# Patient Record
Sex: MALE | Race: WHITE | NOT HISPANIC OR LATINO | URBAN - METROPOLITAN AREA
[De-identification: names, ages, dates, MRNs, and addresses within clinical notes are randomized per-mention and may not be internally consistent; named-entity substitution may affect disease eponyms.]

---

## 2018-06-27 ENCOUNTER — OFFICE VISIT - RIVER FALLS (OUTPATIENT)
Dept: FAMILY MEDICINE | Facility: CLINIC | Age: 83
End: 2018-06-27

## 2020-05-07 ENCOUNTER — COMMUNICATION - RIVER FALLS (OUTPATIENT)
Dept: FAMILY MEDICINE | Facility: CLINIC | Age: 85
End: 2020-05-07

## 2020-06-01 ENCOUNTER — OFFICE VISIT - RIVER FALLS (OUTPATIENT)
Dept: FAMILY MEDICINE | Facility: CLINIC | Age: 85
End: 2020-06-01

## 2020-06-02 ENCOUNTER — COMMUNICATION - RIVER FALLS (OUTPATIENT)
Dept: FAMILY MEDICINE | Facility: CLINIC | Age: 85
End: 2020-06-02

## 2020-06-02 LAB
ERYTHROCYTE [DISTWIDTH] IN BLOOD BY AUTOMATED COUNT: 15.9 % (ref 11–15)
FERRITIN SERPL-MCNC: 319 NG/ML (ref 24–380)
HCT VFR BLD AUTO: 29.2 % (ref 38.5–50)
HGB BLD-MCNC: 9.8 GM/DL (ref 13.2–17.1)
MCH RBC QN AUTO: 33 PG (ref 27–33)
MCHC RBC AUTO-ENTMCNC: 33.6 GM/DL (ref 32–36)
MCV RBC AUTO: 98.3 FL (ref 80–100)
PLATELET # BLD AUTO: 199 10*3/UL (ref 140–400)
PMV BLD: 11.1 FL (ref 7.5–12.5)
RBC # BLD AUTO: 2.97 10*6/UL (ref 4.2–5.8)
WBC # BLD AUTO: 9.6 10*3/UL (ref 3.8–10.8)

## 2020-06-04 ENCOUNTER — COMMUNICATION - RIVER FALLS (OUTPATIENT)
Dept: FAMILY MEDICINE | Facility: CLINIC | Age: 85
End: 2020-06-04

## 2020-06-15 ENCOUNTER — OFFICE VISIT - RIVER FALLS (OUTPATIENT)
Dept: FAMILY MEDICINE | Facility: CLINIC | Age: 85
End: 2020-06-15

## 2020-09-21 ENCOUNTER — OFFICE VISIT - RIVER FALLS (OUTPATIENT)
Dept: FAMILY MEDICINE | Facility: CLINIC | Age: 85
End: 2020-09-21

## 2020-12-16 ENCOUNTER — OFFICE VISIT - RIVER FALLS (OUTPATIENT)
Dept: FAMILY MEDICINE | Facility: CLINIC | Age: 85
End: 2020-12-16

## 2020-12-16 LAB
BUN SERPL-MCNC: 36 MG/DL
BUN/CREAT RATIO - HISTORICAL: 27
CALCIUM SERPL-MCNC: 8.2 MEQ/DL
CREAT SERPL-MCNC: 1.34 MG/DL
FERRITIN SERPL-MCNC: 288.9 NG/ML
GLUCOSE BLD-MCNC: 109 MG/DL
HCT VFR BLD AUTO: 25 %
HGB BLD-MCNC: 8.5 G/DL
PLATELET # BLD AUTO: 157 X10
POTASSIUM BLD-SCNC: 4 MEQ/L
RBC # BLD AUTO: 2.46 X10
SODIUM SERPL-SCNC: 138 MEQ/L
VIT B12 SERPL-MCNC: 1300 PG/ML
WBC # BLD AUTO: 8.8 X10

## 2021-02-05 ENCOUNTER — OFFICE VISIT - RIVER FALLS (OUTPATIENT)
Dept: FAMILY MEDICINE | Facility: CLINIC | Age: 86
End: 2021-02-05

## 2021-05-25 ENCOUNTER — RECORDS - HEALTHEAST (OUTPATIENT)
Dept: ADMINISTRATIVE | Facility: CLINIC | Age: 86
End: 2021-05-25

## 2022-02-12 VITALS
WEIGHT: 155 LBS | TEMPERATURE: 97.2 F | HEART RATE: 75 BPM | SYSTOLIC BLOOD PRESSURE: 100 MMHG | DIASTOLIC BLOOD PRESSURE: 60 MMHG

## 2022-02-12 VITALS
HEART RATE: 64 BPM | SYSTOLIC BLOOD PRESSURE: 120 MMHG | TEMPERATURE: 95.6 F | WEIGHT: 155 LBS | DIASTOLIC BLOOD PRESSURE: 58 MMHG

## 2022-02-12 VITALS
SYSTOLIC BLOOD PRESSURE: 124 MMHG | OXYGEN SATURATION: 96 % | DIASTOLIC BLOOD PRESSURE: 62 MMHG | WEIGHT: 152.4 LBS | BODY MASS INDEX: 24.6 KG/M2 | HEART RATE: 92 BPM

## 2022-02-12 VITALS
HEART RATE: 70 BPM | DIASTOLIC BLOOD PRESSURE: 62 MMHG | SYSTOLIC BLOOD PRESSURE: 120 MMHG | WEIGHT: 161 LBS | TEMPERATURE: 97.3 F

## 2022-02-15 NOTE — PROGRESS NOTES
Chief Complaint    trouble hearing. pt c/o wax bulid up in ears  History of Present Illness      Patient is here with concerns about cerumen impaction.  He has had this problem in the past.  He wears hearing aids.  It is been difficult for him to use his hearing aids due to the current cerumen problem.  Review of Systems      See HPI.  All other review of systems negative.  Physical Exam   Vitals & Measurements    T: 97.3   F (Tympanic)  HR: 70(Peripheral)  BP: 120/62       Alert, oriented, no acute distress       Ear canals are occluded with cerumen       Normal heart rate       Nonlabored breathing  Assessment/Plan       Bilateral impacted cerumen (H61.23)        Cerumen is removed with a combination of curette and lavage revealing normal tympanic membranes bilaterally.  He will continue with weekly drops to help control the cerumen.  He may return to using his hearing aids.  Patient Information     Name:DAKOTA BOWEN      Address:      [address] [place] [address]      Sex:Male      YOB: 1930      [phone]     MRN:[id number]     FIN:[id number]     Location:[facility]     Date of Service:06/27/2018      Primary Care Physician:       Newton Hein MD, [phone]      Attending Physician:       Kwabena Marie MD, [phone]  Problem List/Past Medical History    Ongoing     Aortic stenosis       Comments: Porcine AoV 8/2007     Colon Polyps     GERD (Gastroesophageal Reflux Disease)     Sensory Peripheral Neuropathy     Umbilical Hernia    Historical     No qualifying data  Procedure/Surgical History     Colonoscopy (11/19/2008)     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        Vitamin C: 0 Refill(s).        beta-carotene: PO, Daily.        lysine: 0 Refill(s).        Saw Palmetto: 0 Refill(s).         omeprazole 40 mg oral delayed release capsule: 40 mg, 1 cap(s), PO, Daily, 30 cap(s).        furosemide: daily, 0 Refill(s).                Allergies    No known allergies  Social History    Smoking Status - 12/30/2011     No     Alcohol - Current, 04/22/2010     Employment and Education      Retired, 12/30/2011     Home and Environment      Marital status: ., 12/30/2011     Tobacco - Denies Tobacco Use, 04/22/2010  Family History    Asthma: Father.    CHF - Congestive heart failure: Father.  Immunizations      Vaccine Date Status      pneumococcal (PPSV23) 12/03/2010 Given

## 2022-02-15 NOTE — NURSING NOTE
Comprehensive Intake Entered On:  6/1/2020 11:18 AM CDT    Performed On:  6/1/2020 11:08 AM CDT by Susan Caruso               Summary   Chief Complaint :   Face to face for home care, check R shoulder to see if sling can come off, labs- hgb.   Weight Measured :   155.0 lb(Converted to: 155 lb 0 oz, 70.31 kg)    Systolic Blood Pressure :   100 mmHg   Diastolic Blood Pressure :   60 mmHg   Mean Arterial Pressure :   73 mmHg   Peripheral Pulse Rate :   75 bpm   BP Site :   Right arm   BP Method :   Manual   HR Method :   Electronic   Temperature Tympanic :   97.2 DegF(Converted to: 36.2 DegC)  (LOW)    Susan Caruso - 6/1/2020 11:08 AM CDT   Health Status   Allergies Verified? :   Yes   Medication History Verified? :   Yes   Medical History Verified? :   Yes   Pre-Visit Planning Status :   N/A   Tobacco Use? :   Never smoker   Susan Caruso - 6/1/2020 11:08 AM CDT   Consents   Consent for Immunization Exchange :   Consent Granted   Consent for Immunizations to Providers :   Consent Granted   Susan Caruso - 6/1/2020 11:08 AM CDT   Meds / Allergies   (As Of: 6/1/2020 11:18:35 AM CDT)   Allergies (Active)   No known allergies  Estimated Onset Date:   Unspecified ; Created By:   Marisol Almendarez; Reaction Status:   Active ; Category:   Drug ; Substance:   No known allergies ; Type:   Allergy ; Updated By:   Marisol Almendarez; Reviewed Date:   6/1/2020 11:16 AM CDT        Medication List   (As Of: 6/1/2020 11:18:35 AM CDT)   Prescription/Discharge Order    omeprazole  :   omeprazole ; Status:   Prescribed ; Ordered As Mnemonic:   omeprazole 40 mg oral delayed release capsule ; Simple Display Line:   40 mg, 1 cap(s), PO, Daily, 30 cap(s) ; Ordering Provider:   Newton Hein MD; Catalog Code:   omeprazole ; Order Dt/Tm:   4/26/2010 11:00:12 AM CDT            Home Meds    ergocalciferol  :   ergocalciferol ; Status:   Documented ; Ordered As Mnemonic:   Vitamin D2 ; Simple Display Line:   Oral, take every 3rd  day, 0 Refill(s) ; Catalog Code:   ergocalciferol ; Order Dt/Tm:   4/21/2020 11:03:08 AM CDT          calcium carbonate  :   calcium carbonate ; Status:   Documented ; Ordered As Mnemonic:   Tums ; Simple Display Line:   0.5 tab, Chewed, daily, 0 Refill(s) ; Catalog Code:   calcium carbonate ; Order Dt/Tm:   4/21/2020 11:02:17 AM CDT          multivitamin  :   multivitamin ; Status:   Documented ; Ordered As Mnemonic:   Daily Multiple Vitamins ; Simple Display Line:   1 tab(s), Oral, daily, 0 Refill(s) ; Catalog Code:   multivitamin ; Order Dt/Tm:   4/21/2020 10:57:10 AM CDT          sertraline  :   sertraline ; Status:   Documented ; Ordered As Mnemonic:   sertraline 50 mg oral tablet ; Simple Display Line:   25 mg, 0.5 tab(s), Oral, daily, 0 Refill(s) ; Catalog Code:   sertraline ; Order Dt/Tm:   4/21/2020 10:57:32 AM CDT          lidocaine topical  :   lidocaine topical ; Status:   Documented ; Ordered As Mnemonic:   lidocaine 5% topical film ; Simple Display Line:   1 patch(es), Topical, daily, 0 Refill(s) ; Catalog Code:   lidocaine topical ; Order Dt/Tm:   4/21/2020 10:56:33 AM CDT          ferrous sulfate  :   ferrous sulfate ; Status:   Documented ; Ordered As Mnemonic:   ferrous sulfate 325 mg (65 mg elemental iron) oral delayed release tablet ; Simple Display Line:   325 mg, 1 tab(s), Oral, daily, 0 Refill(s) ; Catalog Code:   ferrous sulfate ; Order Dt/Tm:   4/21/2020 10:55:10 AM CDT          omega-3 polyunsaturated fatty acids  :   omega-3 polyunsaturated fatty acids ; Status:   Documented ; Ordered As Mnemonic:   Fish Oil ; Simple Display Line:   Oral, 1 caps po every 3rd day, 0 Refill(s) ; Catalog Code:   omega-3 polyunsaturated fatty acids ; Order Dt/Tm:   4/21/2020 10:55:32 AM CDT          beta-carotene  :   beta-carotene ; Status:   Documented ; Ordered As Mnemonic:   beta-carotene ; Simple Display Line:   1 tab, Oral, daily, 0 Refill(s) ; Catalog Code:   beta-carotene ; Order Dt/Tm:   4/21/2020  10:54:29 AM CDT          aspirin  :   aspirin ; Status:   Processing ; Ordered As Mnemonic:   aspirin 81 mg oral delayed release tablet ; Action Display:   Complete ; Catalog Code:   aspirin ; Order Dt/Tm:   6/1/2020 11:14:39 AM CDT          calcium-vitamin D  :   calcium-vitamin D ; Status:   Documented ; Ordered As Mnemonic:   Calcium 500+D ; Simple Display Line:   See Instructions, 1 tab(s) every 3rd day, 0 Refill(s) ; Catalog Code:   calcium-vitamin D ; Order Dt/Tm:   4/21/2020 10:53:57 AM CDT          acetaminophen  :   acetaminophen ; Status:   Documented ; Ordered As Mnemonic:   acetaminophen 325 mg oral tablet ; Simple Display Line:   650 mg, 2 tab(s), Oral, q6 hrs, 0 Refill(s) ; Catalog Code:   acetaminophen ; Order Dt/Tm:   4/21/2020 10:51:59 AM CDT          melatonin  :   melatonin ; Status:   Processing ; Ordered As Mnemonic:   melatonin 3 mg oral tablet ; Action Display:   Complete ; Catalog Code:   melatonin ; Order Dt/Tm:   6/1/2020 11:15:03 AM CDT          traMADol  :   traMADol ; Status:   Processing ; Ordered As Mnemonic:   traMADol 50 mg oral tablet ; Action Display:   Complete ; Catalog Code:   traMADol ; Order Dt/Tm:   6/1/2020 11:15:43 AM CDT          furosemide  :   furosemide ; Status:   Documented ; Ordered As Mnemonic:   furosemide ; Simple Display Line:   See Instructions, every other day, 0 Refill(s) ; Catalog Code:   furosemide ; Order Dt/Tm:   6/27/2018 1:55:57 PM CDT          lysine  :   lysine ; Status:   Documented ; Ordered As Mnemonic:   lysine ; Simple Display Line:   Oral, daily, 0 Refill(s) ; Catalog Code:   lysine ; Order Dt/Tm:   4/22/2010 6:19:20 PM CDT          saw palmetto  :   saw palmetto ; Status:   Documented ; Ordered As Mnemonic:   Saw Palmetto ; Catalog Code:   saw palmetto ; Order Dt/Tm:   4/22/2010 6:19:33 PM CDT          ascorbic acid  :   ascorbic acid ; Status:   Documented ; Ordered As Mnemonic:   Vitamin C ; Simple Display Line:   1 tab, Oral, daily, 0  Refill(s) ; Catalog Code:   ascorbic acid ; Order Dt/Tm:   4/22/2010 6:18:04 PM CDT            ID Risk Screen   Recent Travel History :   No recent travel   Family Member Travel History :   No recent travel   Other Exposure to Infectious Disease :   Unknown   Daisy/Susan MONTALVO - 6/1/2020 11:08 AM CDT

## 2022-02-15 NOTE — CARE COORDINATION
Patient:   DAKOTA BOWEN            MRN: [id number]            FIN: [id number]               Age:   89 years     Sex:  Male     :  3/26/1930   Associated Diagnoses:   None   Author:   Kiki Mcgrath CMA      Sources of Information:  [ ] Patient, family member, or caregiver (Please list):  [x ] Hospital discharge summary  [ ] Hospital fax  [ ] List of recent hospitalizations or ED visits  [ ] Other:     Discharged From: [facility]  Discharge Date: 2020    Diagnosis/Problem: Fall w/ clavicle fx    Medication Changes: [ x] Yes [ ] No   Medication List Updated: [ x] Yes [ ] No  Hospital medication list reconciled with clinic medication list: Yes  Medications          *denotes recorded medication          *acetaminophen 325 mg oral tablet: 650 mg, 2 tab(s), Oral, q6 hrs, 0 Refill(s).          *Vitamin C: 1 tab, Oral, daily, 0 Refill(s).          *aspirin 81 mg oral delayed release tablet: 81 mg, 1 tab(s), Oral, daily, 0 Refill(s).          *beta-carotene: 1 tab, Oral, daily, 0 Refill(s).          *Tums: 0.5 tab, Chewed, daily, 0 Refill(s).          *Calcium 500+D: See Instructions, 1 tab(s) every 3rd day, 0 Refill(s).          *Vitamin D2: Oral, take every 3rd day, 0 Refill(s).          *ferrous sulfate 325 mg (65 mg elemental iron) oral delayed release tablet: 325 mg, 1 tab(s), Oral, daily, 0 Refill(s).          *furosemide: See Instructions, every other day, 0 Refill(s).          *lidocaine 5% topical film: 1 patch(es), Topical, daily, 0 Refill(s).          *lysine: Oral, daily, 0 Refill(s).          *melatonin 3 mg oral tablet: 3 mg, 1 tab(s), Oral, hs, 0 Refill(s).          *Daily Multiple Vitamins: 1 tab(s), Oral, daily, 0 Refill(s).          *Fish Oil: Oral, 1 caps po every 3rd day, 0 Refill(s).          omeprazole 40 mg oral delayed release capsule: 40 mg, 1 cap(s), PO, Daily, 30 cap(s).          *Saw Somis: 0 Refill(s).          *sertraline 50 mg oral tablet: 25 mg, 0.5 tab(s), Oral, daily, 0 Refill(s).           *traMADol 50 mg oral tablet: 50 mg, 1 tab(s), Oral, tid, PRN: for pain.      Needs Referral or Lab: [ ] Yes [x ] No    Needs Follow-up Appointment: N/A-appt to be made w/ [facility] in 2-4 weeks per d/c  [ ] Within 7 days of discharge (highly complex visit)  [ ] Within 14 days of discharge (moderately complex visit)    Appointment Made With: N/A  Date:    Pt f/u with a different HCP-d/c to son's home w/ home health in place. No CC phone call made at this time.

## 2022-02-15 NOTE — TELEPHONE ENCOUNTER
---------------------  From: Cata Dumont (Phone Messages [facility] ([id number]))   To: Brionna Castillo MD;     Sent: 5/7/2020 11:56:57 AM CDT  Subject: OT/PT     Phone Message    PCP:   CHENG   Time of Call:  11:27am       Person Calling:  Lexi from [employer]  Phone number:  [phone]  Returned call at:   Last office visit and reason:  cerumen impaction 6/28/18    Note: Pt returning home after 4 weeks of in home care at his son's home in [place] for pelvic fracture. [facility] asking for order's to continue pt's care at pt's home.     Would like PT for gate training 1 more time this week and 2 times for 4 weeks with skilled home nurse. Would also like an ok for OT evaluation and a visit from a .---------------------  From: Brionna Castillo MD   To: Care Coordinators Pool ([facility]);     Sent: 5/7/2020 3:34:07 PM CDT  Subject: RE: OT/PT     will forward to care coordinator to help with thisattempted to contact # provided-busy signal. Will try again later.attempted to contact-unable to reach

## 2022-02-15 NOTE — PROGRESS NOTES
Patient:   DAKOTA BOWEN            MRN: [id number]            FIN: [id number]               Age:   89 years     Sex:  Male     :  3/26/1930   Associated Diagnoses:   Bilateral impacted cerumen   Author:   Newton Hein MD      Chief Complaint   2021 2:56 PM CST     clean ears        History of Present Illness             The patient presents with Bilateral cerumen impaction with decreased hearing.  Exacerbating factors consist of none.  Associated symptoms consist of denies chills, denies fever, denies cough, denies dental pain, denies facial pain, denies headache, denies nasal congestion, denies dizziness and denies vertigo.        Review of Systems   Constitutional:  Negative.    Ear/Nose/Mouth/Throat:  Negative.    Respiratory:  Negative.       Health Status   Allergies:    Allergic Reactions (Selected)  No Known Medication Allergies   Medications:  (Selected)   Documented Medications  Documented  Calcium 500+D: See Instructions, Instructions: 1 tab(s) every 3rd day, 0 Refill(s), Type: Maintenance  CoQ10: ( 300 mg ), Oral, daily, 0 Refill(s), Type: Maintenance  Fish Oil: Oral, Instructions: 1 caps po every 3rd day, 0 Refill(s), Type: Maintenance  Protonix 40 mg oral delayed release tablet: = 1 tab(s) ( 40 mg ), Oral, bid, # 30 tab(s), 0 Refill(s), Type: Maintenance  acetaminophen 325 mg oral tablet: = 2 tab(s) ( 650 mg ), Oral, q6 hrs, 0 Refill(s), Type: Maintenance  cefdinir 300 mg oral capsule: 0 Refill(s), Type: Soft Stop  loperamide: 0 Refill(s), Type: Maintenance  lysine: Oral, daily, 0 Refill(s), Type: Maintenance  sertraline 50 mg oral tablet: = 1 tab(s) ( 50 mg ), Oral, daily, 0 Refill(s), Type: Maintenance   Problem list:    All Problems  Atherosclerotic heart disease of native coronary artery without angina pectoris / SNOMED CT 1145873919 / Confirmed  Gastro-esophageal reflux disease without esophagitis / SNOMED CT 4025173799 / Confirmed  Hereditary and idiopathic neuropathy, unspecified /  SNOMED CT 827081821 / Confirmed  History of prosthetic aortic valve / SNOMED CT 5146828902 / Confirmed  Major depressive disorder, recurrent, mild / SNOMED CT 21188402 / Confirmed  Polyp of colon / SNOMED CT 927813485 / Confirmed  Umbilical hernia without obstruction or gangrene / SNOMED CT 5987389454 / Confirmed  Resolved: Aortic stenosis / ICD-9-.1  Resolved: Inpatient stay / SNOMED CT 687410894  Resolved: Inpatient stay / SNOMED CT 117111288  Resolved: Inpatient stay / SNOMED CT 195349336  Resolved: Inpatient stay / SNOMED CT 957507344      Histories   Past Medical History:    Active  Gastro-esophageal reflux disease without esophagitis (0162078620): Onset on 4/26/2010 at 80 years.  Comments:  4/26/2010 CDT 10:59 AM CDT -     Polyp of colon (200708343)  Umbilical hernia without obstruction or gangrene (5854566601)  Hereditary and idiopathic neuropathy, unspecified (423046305)  Atherosclerotic heart disease of native coronary artery without angina pectoris (3173183113)  Major depressive disorder, recurrent, mild (66779522)  Resolved  Inpatient stay (746110991): Onset on 12/8/2020 at 89 years.  Resolved on 12/14/2020 at 89 years.  Comments:  12/15/2020 CST 1:40 PM CST - [facility], [place] - After care, community acquired pneumonia.  Inpatient stay (656239420): Onset on 12/4/2020 at 89 years.  Resolved on 12/8/2020 at 89 years.  Comments:  12/10/2020 CST 9:54 AM CST - [place], [place] - Acute hypoxemic respiratory failure due to community acquired pneumonia.  Inpatient stay (685626761): Onset on 5/17/2020 at 89 years.  Resolved on 5/20/2020 at 89 years.  Comments:  6/11/2020 CDT 1:52 PM CDT - [facility] - Upper GI bleed due to esophageal ulceration.  Inpatient stay (087794661): Onset on 4/18/2020 at 89 years.  Resolved on 4/20/2020 at 89 years.  Comments:  4/22/2020 CDT 11:21 AM CDT - Christine Harvey  @[facility] with  clavicular fracture.  Aortic stenosis (424.1):  Resolved.  Comments:  2010 CDT 11:06 AM Newton Vickers MD  Porcine AoV 2007   Family History:    Asthma  Father ()  CHF - Congestive heart failure  Father ()     Procedure history:    Esophagogastroduodenoscopy (470305589) on 2020 at 89 Years.  Comments:  2020 1:31 PM CDT - Maryjo Graham  Dx: Upper GI bleed.  Colonoscopy (205393389) on 2008 at 78 Years.  Comments:  2011 8:35 AM Newton Capone MD  Repeat colonoscopy in 5 years    2010 6:21 PM Marisol Rodríguez  Severe diverticular disease and lipoma; previous colonoscopy revealed adenomatous polyps  Porcine aortic valve replacement (177850161) in the month of 2007 at 77 Years.  Laminectomy (5863834448) in  at 71 Years.  Comments:  2010 6:23 PM Marisol Rodríguez  L3, 4, 5  left lithotripsy in  at 71 Years.  Knee replacement (918651078) in  at 68 Years.  Comments:  2010 6:24 PM Marisol Rodríguez  Left  Arthroscopy of knee (872657778) in  at 62 Years.      Physical Examination   Vital Signs   2021 2:56 PM CST Temperature Tympanic 95.6 DegF  LOW    Peripheral Pulse Rate 64 bpm    HR Method Manual    Systolic Blood Pressure 120 mmHg    Diastolic Blood Pressure 58 mmHg  LOW    Mean Arterial Pressure 79 mmHg    BP Site Right arm    BP Method Manual      Measurements from flowsheet : Measurements   2021 2:56 PM CST Height/Length Estimated 66 in    Weight Measured - Standard 155 lb      General:  Alert and oriented, No acute distress.    Eye:  Pupils are equal, round and reactive to light, Extraocular movements are intact, Normal conjunctiva.    HENT:  Normocephalic, Oral mucosa is moist, No pharyngeal erythema, No sinus tenderness, Impacted cerumen both ears removed with lavage and bayonet forceps revealing normal anatomy .    Neck:  Supple, Non-tender.    Respiratory:  Lungs are clear to auscultation.       Impression and  Plan   Diagnosis     Bilateral impacted cerumen ([id number]-QJ H61.23).     Course:  removed.

## 2022-02-15 NOTE — LETTER
(Inserted Image. Unable to display)   June 04, 2020      DAKOTA BOWEN      [facility] [place], [place] [id number]        Dear DAKOTA,    Thank you for selecting [facility] ([facility]viously [facility] for your healthcare needs.  Below you will find the results of your recent tests done at our clinic.     Iron levels normal.      Result Name Current Result Reference Range   Iron Level (mcg/dL)  92 6/1/2020 50 - 180   TIBC ((L)) 194 6/1/2020 250 - 425   Iron Saturation  47 6/1/2020 20 - 48       Please contact me or my assistant at [phone] if you have any questions.     Sincerely,        Newton Hein MD

## 2022-02-15 NOTE — NURSING NOTE
Comprehensive Intake Entered On:  9/21/2020 1:44 PM CDT    Performed On:  9/21/2020 1:42 PM CDT by Jennifer Garvey CMA               Summary   Chief Complaint :   Clean Ears   Weight Measured :   152.4 lb(Converted to: 152 lb 6 oz, 69.13 kg)    Systolic Blood Pressure :   124 mmHg   Diastolic Blood Pressure :   62 mmHg   Mean Arterial Pressure :   83 mmHg   Peripheral Pulse Rate :   92 bpm   BP Site :   Right arm   BP Method :   Manual   HR Method :   Electronic   Oxygen Saturation :   96 %   Jennifer Garvey CMA - 9/21/2020 1:42 PM CDT   Health Status   Allergies Verified? :   Yes   Medication History Verified? :   Yes   Medical History Verified? :   Yes   Tobacco Use? :   Never smoker   Jennifer Garvey CMA - 9/21/2020 1:42 PM CDT   Consents   Consent for Immunization Exchange :   Consent Granted   Consent for Immunizations to Providers :   Consent Granted   Jenniefr Garvey CMA - 9/21/2020 1:42 PM CDT   Meds / Allergies   (As Of: 9/21/2020 1:44:51 PM CDT)   Allergies (Active)   No Known Medication Allergies  Estimated Onset Date:   Unspecified ; Created By:   Jennifer Garvey CMA; Reaction Status:   Active ; Category:   Drug ; Substance:   No Known Medication Allergies ; Type:   Allergy ; Updated By:   Jennifer Garvey CMA; Reviewed Date:   9/21/2020 1:42 PM CDT        Medication List   (As Of: 9/21/2020 1:44:51 PM CDT)   Prescription/Discharge Order    omeprazole  :   omeprazole ; Status:   Prescribed ; Ordered As Mnemonic:   omeprazole 40 mg oral delayed release capsule ; Simple Display Line:   40 mg, 1 cap(s), PO, Daily, 30 cap(s) ; Ordering Provider:   Newton Hein MD; Catalog Code:   omeprazole ; Order Dt/Tm:   4/26/2010 11:00:12 AM CDT            Home Meds    acetaminophen  :   acetaminophen ; Status:   Documented ; Ordered As Mnemonic:   acetaminophen 325 mg oral tablet ; Simple Display Line:   650 mg, 2 tab(s), Oral, q6 hrs, 0 Refill(s) ; Catalog Code:   acetaminophen ; Order Dt/Tm:   4/21/2020 10:51:59 AM CDT           ascorbic acid  :   ascorbic acid ; Status:   Documented ; Ordered As Mnemonic:   Vitamin C ; Simple Display Line:   1 tab, Oral, daily, 0 Refill(s) ; Catalog Code:   ascorbic acid ; Order Dt/Tm:   4/22/2010 6:18:04 PM CDT          beta-carotene  :   beta-carotene ; Status:   Documented ; Ordered As Mnemonic:   beta-carotene ; Simple Display Line:   1 tab, Oral, daily, 0 Refill(s) ; Catalog Code:   beta-carotene ; Order Dt/Tm:   4/21/2020 10:54:29 AM CDT          calcium-vitamin D  :   calcium-vitamin D ; Status:   Documented ; Ordered As Mnemonic:   Calcium 500+D ; Simple Display Line:   See Instructions, 1 tab(s) every 3rd day, 0 Refill(s) ; Catalog Code:   calcium-vitamin D ; Order Dt/Tm:   4/21/2020 10:53:57 AM CDT          calcium carbonate  :   calcium carbonate ; Status:   Documented ; Ordered As Mnemonic:   Tums ; Simple Display Line:   0.5 tab, Chewed, daily, 0 Refill(s) ; Catalog Code:   calcium carbonate ; Order Dt/Tm:   4/21/2020 11:02:17 AM CDT          ergocalciferol  :   ergocalciferol ; Status:   Documented ; Ordered As Mnemonic:   Vitamin D2 ; Simple Display Line:   Oral, take every 3rd day, 0 Refill(s) ; Catalog Code:   ergocalciferol ; Order Dt/Tm:   4/21/2020 11:03:08 AM CDT          ferrous sulfate  :   ferrous sulfate ; Status:   Documented ; Ordered As Mnemonic:   ferrous sulfate 325 mg (65 mg elemental iron) oral delayed release tablet ; Simple Display Line:   325 mg, 1 tab(s), Oral, daily, 0 Refill(s) ; Catalog Code:   ferrous sulfate ; Order Dt/Tm:   4/21/2020 10:55:10 AM CDT          lidocaine topical  :   lidocaine topical ; Status:   Documented ; Ordered As Mnemonic:   lidocaine 5% topical film ; Simple Display Line:   1 patch(es), Topical, daily, 0 Refill(s) ; Catalog Code:   lidocaine topical ; Order Dt/Tm:   4/21/2020 10:56:33 AM CDT          lysine  :   lysine ; Status:   Documented ; Ordered As Mnemonic:   lysine ; Simple Display Line:   Oral, daily, 0 Refill(s) ; Catalog Code:    lysine ; Order Dt/Tm:   4/22/2010 6:19:20 PM CDT          multivitamin  :   multivitamin ; Status:   Documented ; Ordered As Mnemonic:   Daily Multiple Vitamins ; Simple Display Line:   1 tab(s), Oral, daily, 0 Refill(s) ; Catalog Code:   multivitamin ; Order Dt/Tm:   4/21/2020 10:57:10 AM CDT          omega-3 polyunsaturated fatty acids  :   omega-3 polyunsaturated fatty acids ; Status:   Documented ; Ordered As Mnemonic:   Fish Oil ; Simple Display Line:   Oral, 1 caps po every 3rd day, 0 Refill(s) ; Catalog Code:   omega-3 polyunsaturated fatty acids ; Order Dt/Tm:   4/21/2020 10:55:32 AM CDT          saw palmetto  :   saw palmetto ; Status:   Documented ; Ordered As Mnemonic:   Saw Palmetto ; Catalog Code:   saw palmetto ; Order Dt/Tm:   4/22/2010 6:19:33 PM CDT          sertraline  :   sertraline ; Status:   Documented ; Ordered As Mnemonic:   sertraline 50 mg oral tablet ; Simple Display Line:   25 mg, 0.5 tab(s), Oral, daily, 0 Refill(s) ; Catalog Code:   sertraline ; Order Dt/Tm:   4/21/2020 10:57:32 AM CDT            ID Risk Screen   Recent Travel History :   No recent travel   Family Member Travel History :   No recent travel   Other Exposure to Infectious Disease :   Unknown   Jennifer Garvey CMA - 9/21/2020 1:42 PM CDT

## 2022-02-15 NOTE — TELEPHONE ENCOUNTER
---------------------  From: Minerva Rondon LPN (Phone Messages Pool ([id number]))   To: Advanced Practice Provider Pool ([address] [place] [address]);     Sent: 5/22/2020 1:49:43 PM CDT  Subject: home care orders     Please advise- CHENG out of clinic today  Per note 5/7 looks like Care Coordinator tried to reach Lexi. No documentation that home care was approved/ if [facility] is approving orders.    Phone Message    PCP:   CHENG      Time of Call:  1:11pm       Person Calling:  Lexi- PT [facility]  Phone number:  [phone]    Note:   Lexi LM stating pt was admitted to home care- he has been receiving rehab after a clavicle fracture. Pt then had went to his son's home in [place] where he was hospitalized for a GI bleed.    Lexi says pt is home now and doing well. She is looking for orders to resume pt's PT, OT and home health aide. She is also wondering if he can begin some pendulum exercises and if he can remove his arm sling.    Last office visit and reason:  6/28/18 Cerumen impaction---------------------  From: Fredi Marques PA-C (Advanced Practice Provider Pool ([address] [place] [address]))   To: [facility] [employer] Pool ([address] [place] [address]);     Sent: 5/22/2020 2:40:34 PM CDT  Subject: RE: home care orders     Okay to resume PT, OT, and home health aide.  Also okay to start  pendulum exercises---------------------  From: Minerva Rondon LPN (Phone Messages Pool ([address] [place] [address]))   To: Fredi Marques PA-C;     Sent: 5/22/2020 2:45:42 PM CDT  Subject: FW: home care orders     Can he remove arm sling?---------------------  From: Fredi Marques PA-C   To: Phone Messages Pool ([id number]_[place] [facility] [place]);     Sent: 5/22/2020 2:47:42 PM CDT  Subject: RE: home care orders     if PT thinks he is able,  but that is the call of the orthopedistper original message- OK to KELLY at Lexi's number.    Called Lexi and KELLY with response from TYESHA.

## 2022-02-15 NOTE — TELEPHONE ENCOUNTER
---------------------  From: Cata Bryan CMA   To: [facility] Pool ([id number]_[place] [facility] [place]);     Sent: 5/18/2020 10:07:13 AM CDT  Subject: Clavical fracture     Lexi, physical therapist w/ [employer] called @ 0315 asking [facility] call her or patient to give direction on when to remove arm immobilizer. Patient had a clavicle fracture 4 weeks ago and she thinks it should come off. Her # [phone]---------------------  From: Daisy/Susan MONTALVO ([facility] Pool ([id number] [facility] [place]))   To: Newton Hein MD;     Sent: 5/18/2020 10:07:38 AM CDT  Subject: FW: Clavical fracture---------------------  From: Newton Hein MD   To: [facility] Message Pool ([id number] [facility] [place]);     Sent: 5/18/2020 10:10:57 AM CDT  Subject: RE: Clavical fracture     Agree. Remove immobilizer.LVM on identified VM.

## 2022-02-15 NOTE — PROGRESS NOTES
Patient:   JOSE BOWEN            MRN: [id number]            FIN: [id number]               Age:   89 years     Sex:  Male     :  3/26/1930   Associated Diagnoses:   Community acquired pneumonia; Major depressive disorder, recurrent, mild; Pressure ulcer of back   Author:   Danilo Lovelace MD      Visit Information      Date of Service: 2020 09:22 am  Performing Location: [facility]  Encounter#: [id number]      Primary Care Provider (PCP):  Newton Hein MD    NPI# [id number]      Referring Provider:  Danilo Lovelace MD    NPI# [id number]   Visit type:  Video Visit via [employer].    Participants in room during visit:  son, pt   Location of patient:  home  Location of provider:  _ (Clinic office or Home office)  Video Start Time:  1300  Video End Time:   1315    Today's visit was conducted via video conference due to the COVID-19 pandemic.  The patient's consent to proceed with a video visit has been obtained and documented.      Chief Complaint   2020 12:58 PM CST  post hosp f/u - discharged  - doing well. Home health arranged - PT this AM, SpO2 95% this am after some exercise.  Nurse visit yest, pressure wound evaluated with no new concerns - verbal consent for video visit        History of Present Illness   I spoke to Jose via video conference in the presence of his son, Veronica for hospital follow-up.  Recently admitted to [facility] in the setting of respiratory failure and pneumonia.  His Covid testing was negative.  Subsequently treated like bacterial pneumonia.  He improved well enough for swing bed admission to our local hospital this past week.  Ultimately discharged back home under home health care.  His son states that they tried to avoid nursing home location during the pandemic.  Joshua currently living at home alone though has close contact with family and home health coming 3 days a week.  He says he is in good spirits.  His breathing is nonlabored.  Has not required oxygen at  home.  His son states he needs encouragement to participate with home therapy.         Review of Systems   Constitutional:  Weakness, Fatigue, Decreased activity.    Eye:  Negative.    Ear/Nose/Mouth/Throat:  Negative.    Respiratory:  No shortness of breath, No cough, No hemoptysis, No wheezing.    Cardiovascular:  Negative.    Gastrointestinal:  Negative.    Genitourinary:  Negative.    Immunologic:  Negative.    Musculoskeletal:  Negative.    Integumentary:  Negative.    Neurologic:  Negative.    Psychiatric:  Depression.       Health Status   Allergies:    Allergic Reactions (Selected)  No Known Medication Allergies   Medications:  (Selected)   Documented Medications  Documented  Calcium 500+D: See Instructions, Instructions: 1 tab(s) every 3rd day, 0 Refill(s), Type: Maintenance  CoQ10: ( 300 mg ), Oral, daily, 0 Refill(s), Type: Maintenance  Fish Oil: Oral, Instructions: 1 caps po every 3rd day, 0 Refill(s), Type: Maintenance  Protonix 40 mg oral delayed release tablet: = 1 tab(s) ( 40 mg ), Oral, bid, # 30 tab(s), 0 Refill(s), Type: Maintenance  acetaminophen 325 mg oral tablet: = 2 tab(s) ( 650 mg ), Oral, q6 hrs, 0 Refill(s), Type: Maintenance  cefdinir 300 mg oral capsule: 0 Refill(s), Type: Soft Stop  loperamide: 0 Refill(s), Type: Maintenance  lysine: Oral, daily, 0 Refill(s), Type: Maintenance  sertraline 50 mg oral tablet: = 1 tab(s) ( 50 mg ), Oral, daily, 0 Refill(s), Type: Maintenance,    Medications          *denotes recorded medication          *acetaminophen 325 mg oral tablet: 650 mg, 2 tab(s), Oral, q6 hrs, 0 Refill(s).          *Calcium 500+D: See Instructions, 1 tab(s) every 3rd day, 0 Refill(s).          *cefdinir 300 mg oral capsule: 0 Refill(s).          *loperamide: 0 Refill(s).          *lysine: Oral, daily, 0 Refill(s).          *Fish Oil: Oral, 1 caps po every 3rd day, 0 Refill(s).          *Protonix 40 mg oral delayed release tablet: 40 mg, 1 tab(s), Oral, bid, 30 tab(s), 0  Refill(s).          *sertraline 50 mg oral tablet: 50 mg, 1 tab(s), Oral, daily, 0 Refill(s).          *CoQ10: 300 mg, Oral, daily, 0 Refill(s).       Problem list:    All Problems  Atherosclerotic heart disease of native coronary artery without angina pectoris / SNOMED CT 2501811945 / Confirmed  Gastro-esophageal reflux disease without esophagitis / SNOMED CT 0309754309 / Confirmed  Hereditary and idiopathic neuropathy, unspecified / SNOMED CT 752342444 / Confirmed  History of prosthetic aortic valve / SNOMED CT 5597957102 / Confirmed  Major depressive disorder, recurrent, mild / SNOMED CT 30198832 / Confirmed  Polyp of colon / SNOMED CT 158979240 / Confirmed  Umbilical hernia without obstruction or gangrene / SNOMED CT 4196821552 / Confirmed      Histories   Past Medical History:    Active  Gastro-esophageal reflux disease without esophagitis (1666018829): Onset on 4/26/2010 at 80 years.  Comments:  4/26/2010 CDT 10:59 AM CDT -     Polyp of colon (688819552)  Umbilical hernia without obstruction or gangrene (8261085027)  Hereditary and idiopathic neuropathy, unspecified (737458555)  Atherosclerotic heart disease of native coronary artery without angina pectoris (2481925451)  Major depressive disorder, recurrent, mild (38608064)  Resolved  Inpatient stay (331919287): Onset on 12/8/2020 at 89 years.  Resolved on 12/14/2020 at 89 years.  Comments:  12/15/2020 CST 1:40 PM CST - [facility] [place], [place] - After care, community acquired pneumonia.  Inpatient stay (724939020): Onset on 12/4/2020 at 89 years.  Resolved on 12/8/2020 at 89 years.  Comments:  12/10/2020 CST 9:54 AM CST - [facility] [place], [place] - Acute hypoxemic respiratory failure due to community acquired pneumonia.  Inpatient stay (122325605): Onset on 5/17/2020 at 89 years.  Resolved on 5/20/2020 at 89 years.  Comments:  6/11/2020 CDT 1:52 PM CDT - [facility] - Upper GI bleed due to esophageal  ulceration.  Inpatient stay (774935507): Onset on 2020 at 89 years.  Resolved on 2020 at 89 years.  Comments:  2020 CDT 11:21 AM CDT - Christine Harvey  @[facility] - Fall with clavicular fracture.  Aortic stenosis (424.1):  Resolved.  Comments:  2010 CDT 11:06 AM [facility] - Newton Hein MD  Porcine AoV 2007   Family History:    Asthma  Father ()  CHF - Congestive heart failure  Father ()     Procedure history:    Esophagogastroduodenoscopy (752073595) on 2020 at 89 Years.  Comments:  2020 1:31 PM CDT - Maryjo Graham  Dx: Upper GI bleed.  Colonoscopy (548108580) on 2008 at 78 Years.  Comments:  2011 8:35 AM Newton Capone MD  Repeat colonoscopy in 5 years    2010 6:21 PM Marisol Rodríguez  Severe diverticular disease and lipoma; previous colonoscopy revealed adenomatous polyps  Porcine aortic valve replacement (852251077) in the month of 2007 at 77 Years.  Laminectomy (2911154803) in  at 71 Years.  Comments:  2010 6:23 PM Marisol Rodríguez  L3, 4, 5  left lithotripsy in  at 71 Years.  Knee replacement (619540571) in  at 68 Years.  Comments:  2010 6:24 PM Marisol Rodríguez  Left  Arthroscopy of knee (097141514) in  at 62 Years.   Social History:        Electronic Cigarette/Vaping Assessment            Electronic Cigarette Use: Never.      Alcohol Assessment: Current      Tobacco Assessment: Denies Tobacco Use            not obtained      Employment and Education Assessment            Retired      Home and Environment Assessment            Marital status: .  Lives with Son.        Physical Examination   General:  Alert and oriented, No acute distress, not wearing oxygen.    Eye:  Pupils are equal, round and reactive to light, Normal conjunctiva.    Respiratory:  Respirations are non-labored.    Psychiatric:  Cooperative, Appropriate mood & affect, Normal judgment.       Impression and Plan    Diagnosis     Community acquired pneumonia (VVV72-RD J18.9).     Major depressive disorder, recurrent, mild (JUK32-FU F33.0).     Pressure ulcer of back (JXH22-NA L89.109).        Review / Management     .) hospital f/u, recent [facility] admission (swing bed) after sepsis, hypoxic respiratory failure presentation  - negative COVID testing  - clinically improved with community acquired pneumonia/antibiotic therapies - one more day of cefdinir  - no oxygen needs at home    .) deconditioning, pressure ulcers  - discharged with home health - monitoring pressure wounds  - he and family trying to avoid SNF placement given pandemic - family aware of his extra needs    .) depression  - on sertraline 50mg daily  - seems to be making positive difference

## 2022-02-15 NOTE — LETTER
(Inserted Image. Unable to display)   June 02, 2020      DAKOTA BOWEN      [facility] [place], [place] [id number]        Dear DAKOTA,    Thank you for selecting [facility] ([facility]viously [facility] for your healthcare needs.  Below you will find the results of your recent tests done at our clinic.     Anemia with normal iron ( ferritin level). Repeat blood test in 4 weeks.      Result Name Current Result Reference Range   Ferritin (ng/mL)  319 6/1/2020 24 - 380   WBC  9.6 6/1/2020 3.8 - 10.8   Hgb (gm/dL) ((L)) 9.8 6/1/2020 13.2 - 17.1   Platelet  199 6/1/2020 140 - 400       Please contact me or my assistant at [phone] if you have any questions.     Sincerely,        Newton Hein MD

## 2022-02-15 NOTE — NURSING NOTE
Comprehensive Intake Entered On:  2021 3:06 PM CST    Performed On:  2021 2:56 PM CST by Newton Hein MD               Summary   Chief Complaint :   clean ears   Weight Measured :   155 lb(Converted to: 155 lb 0 oz, 70.307 kg)    Height/Length Estimated :   66 in(Converted to: 5 ft 6 in, 167.64 cm)    Systolic Blood Pressure :   120 mmHg   Diastolic Blood Pressure :   58 mmHg (LOW)    Mean Arterial Pressure :   79 mmHg   Peripheral Pulse Rate :   64 bpm   BP Site :   Right arm   BP Method :   Manual   HR Method :   Manual   Temperature Tympanic :   95.6 DegF(Converted to: 35.3 DegC)  (LOW)    Newton Hein MD - 2021 2:56 PM CST   Health Status   Allergies Verified? :   Yes   Medication History Verified? :   Yes   Medical History Verified? :   Yes   Pre-Visit Planning Status :   Completed   Tobacco Use? :   Never smoker   Newton Hein MD - 2021 2:56 PM CST   Demographics   Last Name :   Carolynn   Address :   [address] [place] [address]   First Name :   [place] South Initial :   A   Responsible Party Date of Birth () :   3/26/1930 CST   City :   [place]   State :   [place]   Zip Code :   [address]   Contact Relationship to Patient (other) :   Patient   Newton Hein MD - 2021 2:56 PM CST   Consents   Consent for Immunization Exchange :   Consent Granted   Consent for Immunizations to Providers :   Consent Granted   Newton Hein MD - 2021 2:56 PM CST   Meds / Allergies   (As Of: 2021 3:06:58 PM CST)   Allergies (Active)   No Known Medication Allergies  Estimated Onset Date:   Unspecified ; Created By:   Jennifer Garvey CMA; Reaction Status:   Active ; Category:   Drug ; Substance:   No Known Medication Allergies ; Type:   Allergy ; Updated By:   Jennifer Garvey CMA; Reviewed Date:   2021 3:01 PM CST        Medication List   (As Of: 2021 3:06:58 PM CST)   Home Meds    acetaminophen  :   acetaminophen ; Status:   Documented ; Ordered As Mnemonic:   acetaminophen 325 mg oral tablet ; Simple  Display Line:   650 mg, 2 tab(s), Oral, q6 hrs, 0 Refill(s) ; Catalog Code:   acetaminophen ; Order Dt/Tm:   4/21/2020 10:51:59 AM CDT          calcium-vitamin D  :   calcium-vitamin D ; Status:   Documented ; Ordered As Mnemonic:   Calcium 500+D ; Simple Display Line:   See Instructions, 1 tab(s) every 3rd day, 0 Refill(s) ; Catalog Code:   calcium-vitamin D ; Order Dt/Tm:   4/21/2020 10:53:57 AM CDT          cefdinir  :   cefdinir ; Status:   Documented ; Ordered As Mnemonic:   cefdinir 300 mg oral capsule ; Simple Display Line:   0 Refill(s) ; Catalog Code:   cefdinir ; Order Dt/Tm:   12/16/2020 12:53:01 PM CST ; Comment:   Responsible Provider: MEIR CRAFT          loperamide  :   loperamide ; Status:   Documented ; Ordered As Mnemonic:   loperamide ; Simple Display Line:   0 Refill(s) ; Catalog Code:   loperamide ; Order Dt/Tm:   12/16/2020 12:58:10 PM CST          lysine  :   lysine ; Status:   Documented ; Ordered As Mnemonic:   lysine ; Simple Display Line:   Oral, daily, 0 Refill(s) ; Catalog Code:   lysine ; Order Dt/Tm:   4/22/2010 6:19:20 PM CDT          omega-3 polyunsaturated fatty acids  :   omega-3 polyunsaturated fatty acids ; Status:   Documented ; Ordered As Mnemonic:   Fish Oil ; Simple Display Line:   Oral, 1 caps po every 3rd day, 0 Refill(s) ; Catalog Code:   omega-3 polyunsaturated fatty acids ; Order Dt/Tm:   4/21/2020 10:55:32 AM CDT          pantoprazole  :   pantoprazole ; Status:   Documented ; Ordered As Mnemonic:   Protonix 40 mg oral delayed release tablet ; Simple Display Line:   40 mg, 1 tab(s), Oral, bid, 30 tab(s), 0 Refill(s) ; Catalog Code:   pantoprazole ; Order Dt/Tm:   12/16/2020 12:58:01 PM CST          sertraline  :   sertraline ; Status:   Documented ; Ordered As Mnemonic:   sertraline 50 mg oral tablet ; Simple Display Line:   50 mg, 1 tab(s), Oral, daily, 0 Refill(s) ; Catalog Code:   sertraline ; Order Dt/Tm:   4/21/2020 10:57:32 AM CDT          ubiquinone  :    ubiquinone ; Status:   Documented ; Ordered As Mnemonic:   CoQ10 ; Simple Display Line:   300 mg, Oral, daily, 0 Refill(s) ; Catalog Code:   ubiquinone ; Order Dt/Tm:   12/16/2020 12:58:20 PM CST            ID Risk Screen   Recent Travel History :   No recent travel   Family Member Travel History :   No recent travel   Other Exposure to Infectious Disease :   Unknown   COVID-19 Testing Status :   No COVID-19 test performed   Newton Hein MD - 2/5/2021 2:56 PM CST   Social History   Social History   (As Of: 2/5/2021 3:06:58 PM CST)   Alcohol:  Current      (Last Updated: 4/22/2010 6:27:56 PM CDT by Marisol Almendarez )         Tobacco:  Denies Tobacco Use      not obtained   (Last Updated: 12/16/2020 1:00:27 PM CST by Shanti Hernandez CMA)   Never (less than 100 in lifetime)   (Last Updated: 2/5/2021 3:03:11 PM CST by Newton Hein MD)          Electronic Cigarette/Vaping:        Electronic Cigarette Use: Never.   (Last Updated: 12/16/2020 1:00:34 PM CST by Shanti Hernandez CMA)          Employment/School:        Retired   (Last Updated: 12/30/2011 8:36:06 AM CST by Newton Hein MD)          Home/Environment:        Marital status: .  Lives with Son.   (Last Updated: 6/11/2020 1:46:53 PM CDT by Maryjo Graham)

## 2022-02-15 NOTE — NURSING NOTE
Comprehensive Intake Entered On:  12/16/2020 1:00 PM CST    Performed On:  12/16/2020 12:58 PM CST by Shanti Hernandez CMA               Summary   Chief Complaint :   post hosp f/u - discharged 12/17 - doing well. Home health arranged - PT this AM, SpO2 95% this am after some exercise.  Nurse visit yest, pressure wound evaluated with no new concerns - verbal consent for video visit   Shanti Hernandez CMA - 12/16/2020 12:58 PM CST   Health Status   Allergies Verified? :   Yes   Medication History Verified? :   Yes   Medical History Verified? :   Yes   Pre-Visit Planning Status :   Completed   Shanti Hernandez CMA - 12/16/2020 12:58 PM CST   ID Risk Screen   Recent Travel History :   No recent travel   Family Member Travel History :   No recent travel   Other Exposure to Infectious Disease :   Unknown   Shanti Heranndez CMA - 12/16/2020 12:58 PM CST   Social History   Social History   (As Of: 12/16/2020 1:00:45 PM CST)   Alcohol:  Current      (Last Updated: 4/22/2010 6:27:56 PM CDT by Marisol Almendarez )         Tobacco:  Denies Tobacco Use      not obtained   (Last Updated: 12/16/2020 1:00:27 PM CST by Shanti Hernandez CMA)          Electronic Cigarette/Vaping:        Electronic Cigarette Use: Never.   (Last Updated: 12/16/2020 1:00:34 PM CST by Shanti Hernandez CMA)          Employment/School:        Retired   (Last Updated: 12/30/2011 8:36:06 AM CST by Newton Hein MD)          Home/Environment:        Marital status: .  Lives with Son.   (Last Updated: 6/11/2020 1:46:53 PM CDT by Maryjo Graham)

## 2022-02-15 NOTE — PROGRESS NOTES
Chief Complaint    Clean Ears  History of Present Illness          Patient is a 9-year-old man who complains of decreased hearing and ears feeling plugged.      He denies any ear pain, sore throat.      No other symptoms.  Review of Systems          Negative except as listed in HPI.  Physical Exam   Vitals & Measurements    HR: 92 (Peripheral)  BP: 124/62  SpO2: 96%  WT: 152.4 lb           Vitals are noted and within normal limits.      In general he is alert, oriented, and in no acute distress.      He walks with a walker.       Ears: Canals bilaterally blocked by cerumen impaction        Ear lavage done by me        After ear lavage: Canals patent, TMs intact, no erythema no bulging.        Patient tolerated the procedure well  Assessment/Plan       Impacted cerumen of both ears (H61.23)         resolved        follow up as needed  Patient Information     Name:DAKOTA BOWEN      Address:      [address] [place] [id number]     Sex:Male     YOB: 1930     [phone]     Emergency Contact:JENNIFER BOWENSUSAN TRINIDAD     MRN:[id number]     FIN:[id number]     Location:[facility]     Date of Service:09/21/2020      Primary Care Physician:       Newton Hein MD, [phone]      Attending Physician:       Hina Jaimes MD, [phone]  Problem List/Past Medical History    Ongoing     Atherosclerotic heart disease of native coronary artery without angina pectoris     Gastro-esophageal reflux disease without esophagitis     Hereditary and idiopathic neuropathy, unspecified     History of prosthetic aortic valve     Major depressive disorder, recurrent, mild     Polyp of colon     Umbilical hernia without obstruction or gangrene    Historical     Aortic stenosis       Comments: Porcine AoV 8/2007     Inpatient stay       Comments: @[facility] - Fall with clavicular fracture.     Inpatient stay       Comments: [facility] - Upper GI  bleed due to esophageal ulceration.  Procedure/Surgical History     Esophagogastroduodenoscopy (05/17/2020)      Comments: Dx: Upper GI bleed..     Colonoscopy (11/19/2008)      Comments: Repeat colonoscopy in 5 years. Severe diverticular disease and lipoma; previous colonoscopy revealed adenomatous polyps.     Porcine aortic valve replacement (08/2007)     Laminectomy (2001)      Comments: L3, 4, 5.     left lithotripsy (2001)     Knee replacement (1998)      Comments: Left.     Arthroscopy of knee (1992)  Medications    acetaminophen 325 mg oral tablet, 650 mg= 2 tab(s), Oral, q6 hrs    beta-carotene, 1 tab, Oral, daily    Calcium 500+D, See Instructions    Daily Multiple Vitamins, 1 tab(s), Oral, daily    ferrous sulfate 325 mg (65 mg elemental iron) oral delayed release tablet, 325 mg= 1 tab(s), Oral, daily    Fish Oil, Oral    lidocaine 5% topical film, 1 patch(es), Topical, daily    lysine, Oral, daily    omeprazole 40 mg oral delayed release capsule, 40 mg= 1 cap(s), Oral, daily, 2 refills    Saw Palmetto    sertraline 50 mg oral tablet, 25 mg= 0.5 tab(s), Oral, daily    Tums, 0.5 tab, Chewed, daily    Vitamin C, 1 tab, Oral, daily    Vitamin D2, Oral  Allergies    No Known Medication Allergies  Social History    Smoking Status     Never smoker     Alcohol - Current     Employment/School      Retired     Home/Environment      Marital status: . Lives with Son.     Tobacco - Denies Tobacco Use  Family History    Asthma: Father.    CHF - Congestive heart failure: Father.  Immunizations      Vaccine Date Status          influenza virus vaccine, inactivated 10/02/2018 Recorded          pneumococcal (PPSV23) 12/03/2010 Given

## 2022-02-15 NOTE — PROGRESS NOTES
Chief Complaint    Face to face for home care, check R shoulder to see if sling can come off, labs- hgb.  History of Present Illness      Patient was hospitalized at [facility] last month with an upper GI bleed.  He has a letter stating that the biopsies were negative but that he might need an endoscopic ultrasound.  He had one melanotic stool.  No recurrent melena, abdominal pain, or heartburn.  He has been well.  He had a fall and was hospitalized in April with a fracture of the right clavicle.  Review of Systems      No headache, chest pain, dyspnea, recurrent falls, edema, fever, cough, abdominal pain.  Physical Exam   Vitals & Measurements    T: 97.2   F (Tympanic)  HR: 75(Peripheral)  BP: 100/60     WT: 155.0 lb       Patient appears comfortable and in no distress.  Alert and oriented.  Sclera anicteric.  Chest clear.  Cardiac exam is regular with a soft systolic murmur.  No edema.  Abdomen nontender.  Right clavicle is nontender with no deformity he has a replaced right shoulder and limited range of motion on that side.  Assessment/Plan       Bleeding acute gastric ulcer (K25.0)         Records from [place].  CBC and ferritin.         Ordered:          46965 office outpatient new 30 minutes (Charge), Quantity: 1, Bleeding acute gastric ulcer  Closed nondisplaced fracture of acromial end of right clavicle  History of prosthetic aortic valve          CBC (h/h, RBC, indices, WBC, Plt)* ([facility]), Specimen Type: Blood, Collection Date: 06/01/20 11:26:00 CDT          Ferritin* ([facility]), Specimen Type: Serum, Collection Date: 06/01/20 11:26:00 CDT                Closed nondisplaced fracture of acromial end of right clavicle (S42.034A)         Discontinue sling.         Ordered:          98992 office outpatient new 30 minutes (Charge), Quantity: 1, Bleeding acute gastric ulcer  Closed nondisplaced fracture of acromial end of right clavicle  History of prosthetic aortic valve                History of prosthetic  aortic valve (Z95.2)         Stable.         Ordered:          [id number] office outpatient new 30 minutes (Charge), Quantity: 1, Bleeding acute gastric ulcer  Closed nondisplaced fracture of acromial end of right clavicle  History of prosthetic aortic valve           Patient Information     Name:DAKOTA BOWEN      Address:      [address] [place] [id number]     Sex:Male     YOB: 1930     [phone]     Emergency Contact:ERIC BOWEN     MRN:[id number]     FIN:[id number]     Location:[facility]     Date of Service:06/01/2020      Primary Care Physician:       Newton Hein MD, [phone]      Attending Physician:       Newton Hein MD, [phone]  Problem List/Past Medical History    Ongoing     Colon Polyps     GERD (Gastroesophageal Reflux Disease)     History of prosthetic aortic valve     Sensory Peripheral Neuropathy     Umbilical Hernia    Historical     Aortic stenosis       Comments: Porcine AoV 8/2007     Inpatient stay       Comments: @[facility] - Fall with clavicular fracture.  Procedure/Surgical History     Colonoscopy (11/19/2008)      Comments: Repeat colonoscopy in 5 years. Severe diverticular disease and lipoma; previous colonoscopy revealed adenomatous polyps.     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     left lithotripsy (2001)     Knee replacement (1998)      Comments: Left.     Arthroscopy of knee (1992)  Medications    acetaminophen 325 mg oral tablet, 650 mg= 2 tab(s), Oral, q6 hrs    beta-carotene, 1 tab, Oral, daily    Calcium 500+D, See Instructions    Daily Multiple Vitamins, 1 tab(s), Oral, daily    ferrous sulfate 325 mg (65 mg elemental iron) oral delayed release tablet, 325 mg= 1 tab(s), Oral, daily    Fish Oil, Oral    furosemide, See Instructions    lidocaine 5% topical film, 1 patch(es), Topical, daily    lysine, Oral, daily     omeprazole 40 mg oral delayed release capsule, 40 mg= 1 cap(s), Oral, daily, 2 refills    Saw Palmetto    sertraline 50 mg oral tablet, 25 mg= 0.5 tab(s), Oral, daily    Tums, 0.5 tab, Chewed, daily    Vitamin C, 1 tab, Oral, daily    Vitamin D2, Oral  Allergies    No known allergies  Social History    Smoking Status - 06/01/2020     Never smoker     Alcohol - Current, 04/22/2010     Employment/School      Retired, 12/30/2011     Home/Environment      Marital status: ., 12/30/2011     Tobacco - Denies Tobacco Use, 04/22/2010  Family History    Asthma: Father.    CHF - Congestive heart failure: Father.  Immunizations      Vaccine Date Status          pneumococcal (PPSV23) 12/03/2010 Given  06/12/2020  The records from [facility] arrived.  Hemoglobin on the day of discharge, 05/20/2020, was 8.1.  EGD did reveal a normal biopsy.  Followup endoscopy was recommended for eight weeks, which would be mid-July.

## 2022-02-15 NOTE — LETTER
(Inserted Image. Unable to display)     January 18, 2021      DAKOTA BOWEN  [address], [place] [id number]          Dear DAKOTA,      Thank you for selecting [facility] (previously [facility] for your healthcare needs.    Our records indicate you are due for the following services:    Follow-up office visit.  Non-Fasting Labs.    If you had your labs done at another facility or with Direct Access Lab Testing at [facility], please bring in a copy of the results to your next visit, mail a copy, or drop off a copy of your results to your Healthcare Provider.    (FYI   Regarding office visits: In some instances, a video visit or telephone visit may be offered as an option.)    You are due for lab work and an office visit; please schedule the lab appointment 1 week before the office visit.  This will assure all results are available to discuss with your Healthcare Provider during your visit.    **It is very helpful if you bring your medication bottles to your appointment.  This assures we have all of your current medications, including strength and dosing information, documented accurately in your medical record.    To schedule an appointment or if you have further questions, please contact your clinic at [phone].      Powered by [employer]    Sincerely,    Danilo Lovelace MD